# Patient Record
Sex: FEMALE | Race: WHITE
[De-identification: names, ages, dates, MRNs, and addresses within clinical notes are randomized per-mention and may not be internally consistent; named-entity substitution may affect disease eponyms.]

---

## 2018-04-24 ENCOUNTER — HOSPITAL ENCOUNTER (OUTPATIENT)
Dept: HOSPITAL 42 - ED | Age: 33
Setting detail: OBSERVATION
LOS: 1 days | Discharge: HOME | End: 2018-04-25
Attending: INTERNAL MEDICINE | Admitting: INTERNAL MEDICINE
Payer: COMMERCIAL

## 2018-04-24 VITALS — BODY MASS INDEX: 30 KG/M2

## 2018-04-24 VITALS — RESPIRATION RATE: 20 BRPM

## 2018-04-24 DIAGNOSIS — E86.0: ICD-10-CM

## 2018-04-24 DIAGNOSIS — Z98.51: ICD-10-CM

## 2018-04-24 DIAGNOSIS — I95.9: ICD-10-CM

## 2018-04-24 DIAGNOSIS — A05.9: Primary | ICD-10-CM

## 2018-04-24 DIAGNOSIS — K76.0: ICD-10-CM

## 2018-04-24 DIAGNOSIS — G43.909: ICD-10-CM

## 2018-04-24 DIAGNOSIS — D64.9: ICD-10-CM

## 2018-04-24 DIAGNOSIS — E86.1: ICD-10-CM

## 2018-04-24 LAB
ALBUMIN SERPL-MCNC: 4.1 G/DL (ref 3–4.8)
ALBUMIN/GLOB SERPL: 1.5 {RATIO} (ref 1.1–1.8)
ALT SERPL-CCNC: 71 U/L (ref 7–56)
APPEARANCE UR: CLEAR
APTT BLD: 23.6 SECONDS (ref 25.1–36.5)
AST SERPL-CCNC: 38 U/L (ref 14–36)
BILIRUB UR-MCNC: NEGATIVE MG/DL
BUN SERPL-MCNC: 14 MG/DL (ref 7–21)
CALCIUM SERPL-MCNC: 8.7 MG/DL (ref 8.4–10.5)
COLOR UR: YELLOW
ERYTHROCYTE [DISTWIDTH] IN BLOOD BY AUTOMATED COUNT: 12.6 % (ref 11.5–14.5)
GFR NON-AFRICAN AMERICAN: > 60
GLUCOSE UR STRIP-MCNC: NEGATIVE MG/DL
HDLC SERPL-MCNC: 39 MG/DL (ref 29–60)
HEPATITIS A IGM: NEGATIVE
HEPATITIS B CORE AB: NEGATIVE
HEPATITIS C ANTIBODY: NEGATIVE
HGB BLD-MCNC: 12.4 G/DL (ref 12–16)
INR PPP: 0.91 (ref 0.93–1.08)
LDLC SERPL-MCNC: 54 MG/DL (ref 0–129)
LEUKOCYTE ESTERASE UR-ACNC: NEGATIVE LEU/UL
LIPASE SERPL-CCNC: 100 U/L (ref 23–300)
MCH RBC QN AUTO: 28.2 PG (ref 25–35)
MCHC RBC AUTO-ENTMCNC: 33.8 G/DL (ref 31–37)
MCV RBC AUTO: 83.6 FL (ref 80–105)
PH UR STRIP: 6 [PH] (ref 4.7–8)
PLATELET # BLD: 247 10^3/UL (ref 120–450)
PMV BLD AUTO: 9.3 FL (ref 7–11)
PROT UR STRIP-MCNC: NEGATIVE MG/DL
PROTHROMBIN TIME: 10.4 SECONDS (ref 9.4–12.5)
RBC # BLD AUTO: 4.39 10^6/UL (ref 3.5–6.1)
RBC # UR STRIP: NEGATIVE /UL
SP GR UR STRIP: 1.01 (ref 1–1.03)
UROBILINOGEN UR STRIP-ACNC: 0.2 E.U./DL
WBC # BLD AUTO: 14.3 10^3/UL (ref 4.5–11)

## 2018-04-24 PROCEDURE — 36415 COLL VENOUS BLD VENIPUNCTURE: CPT

## 2018-04-24 PROCEDURE — 96375 TX/PRO/DX INJ NEW DRUG ADDON: CPT

## 2018-04-24 PROCEDURE — 96361 HYDRATE IV INFUSION ADD-ON: CPT

## 2018-04-24 PROCEDURE — 80061 LIPID PANEL: CPT

## 2018-04-24 PROCEDURE — 83690 ASSAY OF LIPASE: CPT

## 2018-04-24 PROCEDURE — 87389 HIV-1 AG W/HIV-1&-2 AB AG IA: CPT

## 2018-04-24 PROCEDURE — 96376 TX/PRO/DX INJ SAME DRUG ADON: CPT

## 2018-04-24 PROCEDURE — 96372 THER/PROPH/DIAG INJ SC/IM: CPT

## 2018-04-24 PROCEDURE — 85610 PROTHROMBIN TIME: CPT

## 2018-04-24 PROCEDURE — 96365 THER/PROPH/DIAG IV INF INIT: CPT

## 2018-04-24 PROCEDURE — 87040 BLOOD CULTURE FOR BACTERIA: CPT

## 2018-04-24 PROCEDURE — 85027 COMPLETE CBC AUTOMATED: CPT

## 2018-04-24 PROCEDURE — 80074 ACUTE HEPATITIS PANEL: CPT

## 2018-04-24 PROCEDURE — 83735 ASSAY OF MAGNESIUM: CPT

## 2018-04-24 PROCEDURE — 81003 URINALYSIS AUTO W/O SCOPE: CPT

## 2018-04-24 PROCEDURE — 96367 TX/PROPH/DG ADDL SEQ IV INF: CPT

## 2018-04-24 PROCEDURE — 85730 THROMBOPLASTIN TIME PARTIAL: CPT

## 2018-04-24 PROCEDURE — 99285 EMERGENCY DEPT VISIT HI MDM: CPT

## 2018-04-24 PROCEDURE — 85025 COMPLETE CBC W/AUTO DIFF WBC: CPT

## 2018-04-24 PROCEDURE — 80053 COMPREHEN METABOLIC PANEL: CPT

## 2018-04-24 PROCEDURE — 74177 CT ABD & PELVIS W/CONTRAST: CPT

## 2018-04-24 PROCEDURE — 87209 SMEAR COMPLEX STAIN: CPT

## 2018-04-24 PROCEDURE — 82248 BILIRUBIN DIRECT: CPT

## 2018-04-24 PROCEDURE — 87177 OVA AND PARASITES SMEARS: CPT

## 2018-04-24 PROCEDURE — 87045 FECES CULTURE AEROBIC BACT: CPT

## 2018-04-24 RX ADMIN — METRONIDAZOLE SCH: 500 INJECTION, SOLUTION INTRAVENOUS at 07:58

## 2018-04-24 RX ADMIN — ENOXAPARIN SODIUM SCH MG: 40 INJECTION SUBCUTANEOUS at 13:31

## 2018-04-24 RX ADMIN — METRONIDAZOLE SCH MLS/HR: 500 INJECTION, SOLUTION INTRAVENOUS at 21:57

## 2018-04-24 RX ADMIN — CEFEPIME SCH MLS/HR: 1 INJECTION, SOLUTION INTRAVENOUS at 13:32

## 2018-04-24 RX ADMIN — PANTOPRAZOLE SODIUM SCH MG: 40 TABLET, DELAYED RELEASE ORAL at 21:56

## 2018-04-24 RX ADMIN — CEFEPIME SCH MLS/HR: 1 INJECTION, SOLUTION INTRAVENOUS at 21:56

## 2018-04-24 RX ADMIN — METRONIDAZOLE SCH MLS/HR: 500 INJECTION, SOLUTION INTRAVENOUS at 13:32

## 2018-04-24 NOTE — CT
EXAM:

  CT Abdomen and Pelvis With Intravenous Contrast



CLINICAL HISTORY:

  33 years old, female; Pain; Abdominal pain



TECHNIQUE:

  Axial computed tomography images of the abdomen and pelvis with intravenous 

contrast.  All CT scans at this facility use one or more dose reduction 

techniques, viz.: automated exposure control; ma/kV adjustment per patient size 

(including targeted exams where dose is matched to indication; i.e. head); or 

iterative reconstruction technique.

  Coronal and sagittal reformatted images were created and reviewed.



CONTRAST:

  96 mL of OMNI 350 administered intravenously.



COMPARISON:

  No relevant prior studies available.



FINDINGS:

  Lung bases:  Minimal atelectasis/scarring.



 ABDOMEN:

  Liver:  Probable fatty infiltration.  < 0.5 cm lesion.

  Gallbladder and bile ducts:  No calcified stones.  No ductal dilation.

  Pancreas:  No ductal dilation.  No mass.

  Spleen:  No splenomegaly.

  Adrenals:  No mass.

  Kidneys and ureters:  Too small to characterize lesion within LEFT kidney.  

No hydronephrosis.

  Stomach and bowel:  Segmental areas of apparent mild-to-moderate mural 

thickening of large bowel.  No associated inflammatory stranding.  No 

obstruction.



 PELVIS:

  Appendix:  Normal caliber.  No definite inflammation.

  Bladder:  Unremarkable.

  Reproductive:  Multiple ovarian follicles.



 ABDOMEN and PELVIS:

  Intraperitoneal space:  Trace free fluid within pelvis.  No free air.

  Bones/joints:  No acute fracture.

  Soft tissues:  Unremarkable.

  Vasculature:  Unremarkable.  No aneurysm.

  Lymph nodes:  No pathologically enlarged lymph nodes.



IMPRESSION:     

1.  Probable colitis.  Clinical correlation is needed.

2.  Liver lesion.  For patients with low to average risk of malignancy, no 

further follow-up is necessary.  For patients with high risk of malignancy 

(known malignancy that can metastasize or other risk factors), recommend 

follow-up abdominal CT or MR in 6 months.

3.  Incidental/non-acute findings are described above.

## 2018-04-24 NOTE — CON
DATE:



REQUESTING PHYSICIAN:  Daquan Perez MD



REASON FOR CONSULTATION:  I have been asked to see this 33-year-old female,

previously healthy, who comes to the emergency room after waking up in the

middle of the night with severe mid abdominal pain associated with nausea

and vomiting.  In the emergency room, patient had one episode of diarrhea. 

Patient states that her last meal was approximately 8:00 at night from a

[employer].  Patient has never experienced abdominal pain as what she

experienced prior to coming into the hospital.  She denies any hematemesis,

rectal bleeding, fevers, chills, recent travel or recent use of

antibiotics.  CT scan of the abdomen and pelvis performed in the emergency

room showed some mural thickening of the large intestine, fatty liver and

indeterminate small lesion in the liver measuring less than 0.5 cm and

multiple ovarian follicles with a small amount of trace free fluid in the

pelvis.  She denies any further nausea or vomiting, but still has some mid

abdominal pain.



PAST MEDICAL HISTORY:  Unremarkable.



PAST SURGICAL HISTORY:  Notable for , tubal ligation, reversal of

tubal ligation, abdominoplasty and left lumpectomy.



SOCIAL HISTORY:  She denies cigarette smoking, alcohol use.



FAMILY HISTORY:  Noncontributory.



MEDICATIONS:  None.



REVIEW OF SYSTEMS:  A 14-point review of systems is notable for diffuse mid

abdominal pain, nausea, vomiting and one episode of diarrhea.



PHYSICAL EXAMINATION:

GENERAL:  Well-developed female lying in bed, in no acute distress.

VITAL SIGNS:  Reveal temperature of 98, blood pressure 103/42, heart rate

82.

HEENT:  Reveal sclerae to be white.  Conjunctivae pink.

NECK:  Supple.

CHEST:  Lungs are clear.

HEART:  Reveals regular rate and rhythm.

ABDOMEN:  Soft, mild diffuse mid abdominal tenderness.  No rebound.  No

guarding.  Obese.

EXTREMITIES:  Show no edema.



LABORATORY DATA:  Reveals white blood cell count 14.3, hemoglobin 12.4. 

Chemistries reveal normal electrolytes, blood sugar 139, AST 38, ALT 71. 

Lipase is normal.



IMPRESSION:

1.  Probable acute enterocolitis secondary to food poisoning.

2.  Mildly elevated liver enzymes, most likely secondary to hepatic

steatosis.



RECOMMENDATIONS:

1.  Check stool for culture and sensitivity, and ova and parasites.

2.  Gradual diet advancement as tolerated.  She is currently on clear

liquid diet.





__________________________________________

Marquise Lester MD



DD:  2018 12:36:08

DT:  2018 12:38:53

[facility] # [id number]

## 2018-04-24 NOTE — ED PDOC
Arrival/HPI





- General


Chief Complaint: Abdominal Pain


Time Seen by Provider: 04/24/18 02:27


Historian: Patient





- History of Present Illness


Narrative History of Present Illness (Text): 


04/24/18 03:38


A 33 year old female presents to the emergency department complaining of sudden 

onset abdominal cramp-like discomfort, more on the left side of abdomen. 

Patient reports pain developed after eating out at a restaurant earlier today. 

Patient reports pain associated with nausea and an episode of vomiting. Patient 

denies any fever, back pain, urinary complaints or any other complaints at this 

time.





Symptom Onset: Sudden


Symptom Course: Unchanged


Activities at Onset: Rest


Context: Home





Past Medical History





- Provider Review


Nursing Documentation Reviewed: Yes





- Infectious Disease


Hx of Infectious Diseases: None





- Tetanus Immunization


Tetanus Immunization: Unknown





- Past Medical History


Past Medical History: No Previous





- Cardiac


Hx Cardiac Disorders: No





- Pulmonary


Hx Asthma: Yes





- Neurological


Hx Migraine: Yes





- HEENT


Hx HEENT Disorder: No





- Renal


Hx Renal Disorder: No





- Endocrine/Metabolic


Hx Endocrine Disorders: No





- Hematological/Oncological


Hx Blood Disorders: No





- Integumentary


Hx Dermatological Disorder: No





- Musculoskeletal/Rheumatological


Hx Musculoskeletal Disorders: No





- Gastrointestinal


Hx Gastrointestinal Disorders: No





- Genitourinary/Gynecological


Hx Genitourinary Disorders: No





- Psychiatric


Hx Psychophysiologic Disorder: No


Hx Depression: No


Hx Emotional Abuse: No


Hx Physical Abuse: No


Hx Substance Use: No





- Surgical History


Other/Comment: tubaligation reversal tummy tuck





- Anesthesia


Hx Anesthesia: No





- Suicidal Assessment


Feels Threatened In Home Enviroment: No





Family/Social History





- Physician Review


Nursing Documentation Reviewed: Yes


Family/Social History: No Known Family HX


Smoking Status: Never Smoked


Hx Alcohol Use: No


Hx Substance Use: No


Hx Substance Use Treatment: No





Allergies/Home Meds


Allergies/Adverse Reactions: 


Allergies





morphine Allergy (Intermediate, Verified 04/24/18 03:25)


 RASH








Home Medications: 


 Home Meds











 Medication  Instructions  Recorded  Confirmed


 


No Known Home Med  03/24/16 04/24/18














Review of Systems





- Physician Review


All systems were reviewed & negative as marked: Yes





- Review of Systems


Constitutional: absent: Fevers


Gastrointestinal: Abdominal Pain (left side), Nausea, Vomiting


Genitourinary Female: absent: Dysuria, Frequency, Hematuria, Urine Output 

Changes


Musculoskeletal: absent: Back Pain





Physical Exam


Vital Signs Reviewed: Yes


Vital Signs











  Temp Pulse Resp BP Pulse Ox


 


 04/24/18 05:19   85  18  114/68  98


 


 04/24/18 03:19  98.1 F  80  19  136/61  100











Temperature: Afebrile


Blood Pressure: Normal


Pulse: Regular


Respiratory Rate: Normal


Appearance: Positive for: Well-Appearing, Non-Toxic, Comfortable


Pain Distress: None


Mental Status: Positive for: Alert and Oriented X 3





- Systems Exam


Head: Present: Atraumatic, Normocephalic


Pupils: Present: PERRL


Extroacular Muscles: Present: EOMI


Conjunctiva: Present: Normal


Mouth: Present: Moist Mucous Membranes


Neck: Present: Normal Range of Motion


Respiratory/Chest: Present: Clear to Auscultation, Good Air Exchange.  No: 

Respiratory Distress, Accessory Muscle Use


Cardiovascular: Present: Regular Rate and Rhythm, Normal S1, S2.  No: Murmurs


Abdomen: Present: Tenderness (left lower abd area), Normal Bowel Sounds, 

Guarding (voluntary).  No: Rebound


Back: Present: Normal Inspection


Upper Extremity: Present: Normal Inspection.  No: Cyanosis, Edema


Lower Extremity: Present: Normal Inspection.  No: Edema


Neurological: Present: GCS=15, CN II-XII Intact, Speech Normal


Skin: Present: Warm, Dry, Normal Color.  No: Rashes


Psychiatric: Present: Alert, Oriented x 3, Normal Insight, Normal Concentration





Medical Decision Making


ED Course and Treatment: 


04/24/18 03:34


Impression:


A 33 year old female with abdominal pain, nausea and vomiting. 





Plan:


-- labs


-- Urinalysis


-- Pepcid, Toradol, IV fluids, Zofran


-- Reassess and disposition





Progress Notes:





CT Abdomen and Pelvis With Intravenous Contrast


FINDINGS: Lung bases: Minimal atelectasis/scarring.


ABDOMEN: Liver: Probable fatty infiltration. < 0.5 cm lesion.


Gallbladder and bile ducts: No calcified stones. No ductal dilation.


Pancreas: No ductal dilation. No mass.


Spleen: No splenomegaly.


Adrenals: No mass.


Kidneys and ureters: Too small to characterize lesion within LEFT kidney. No 

hydronephrosis.


Stomach and bowel: Segmental areas of apparent mild-to-moderate mural 

thickening of large bowel.


No associated inflammatory stranding. No obstruction.


PELVIS: Appendix: Normal caliber. No definite inflammation.


Bladder: Unremarkable.


Reproductive: Multiple ovarian follicles.


ABDOMEN and PELVIS: Intraperitoneal space: Trace free fluid within pelvis. No 

free air.


Bones/joints: No acute fracture.


Soft tissues: Unremarkable.


Vasculature: Unremarkable. No aneurysm.


Lymph nodes: No pathologically enlarged lymph nodes.


IMPRESSION:


1. Probable colitis. Clinical correlation is needed.


2. Liver lesion. For patients with low to average risk of malignancy, no 

further follow-up is necessary.


For patients with high risk of malignancy (known malignancy that can 

metastasize or other risk


factors), recommend follow-up abdominal CT or MR in 6 months.


3. Incidental/non-acute findings are described above.


Dictated and Authenticated by: Catracho Jean MD


04/24/2018 6:04 AM Eastern Time ([place] & [place])











04/24/18 06:46


Case was d/w  who accepts to his service.Request  on 

consult.Medical resident notified.





- Lab Interpretations


Lab Results: 








 04/24/18 03:55 





 04/24/18 03:55 





 Lab Results





04/24/18 03:55: WBC 14.3 H D, RBC 4.39, Hgb 12.4, Hct 36.7, MCV 83.6, MCH 28.2, 

MCHC 33.8, RDW 12.6, Plt Count 247, MPV 9.3


04/24/18 03:55: Sodium 142, Potassium 3.8, Chloride 106, Carbon Dioxide 24, 

Anion Gap 17, BUN 14, Creatinine 1.0, Est GFR (African Amer) > 60, Est GFR (Non-

Af Amer) > 60, Random Glucose 139 H, Calcium 8.7, Total Bilirubin 0.2, AST 38 H

, ALT 71 H, Alkaline Phosphatase 52, Total Protein 6.8, Albumin 4.1, Globulin 

2.7, Albumin/Globulin Ratio 1.5, Lipase 100








I have reviewed the lab results: Yes





- RAD Interpretation


Radiology Orders: 








04/24/18 04:47


ABD & PELVIS IV CONTRAST ONLY [CT] Stat 














- Medication Orders


Current Medication Orders: 








Ceftriaxone Sodium (Rocephin 1 Gram Ivpb)  1 gm in 100 mls @ 200 mls/hr IV ONCE 

STA


   PRN Reason: Protocol


   Stop: 04/24/18 07:10


Metronidazole (Flagyl)  500 mg in 100 mls @ 100 mls/hr IV STAT STA


   PRN Reason: Protocol


   Stop: 04/24/18 07:40





Discontinued Medications





Famotidine (Pepcid)  20 mg IVP STAT STA


   Stop: 04/24/18 03:27


   Last Admin: 04/24/18 03:53  Dose: 20 mg





IVP Administration


 Document     04/24/18 03:53  RG  (Rec: 04/24/18 03:59  RG  [id number])


     Charges for Administration


      # of IVP Administrations                   1





Sodium Chloride (Sodium Chloride 0.9%)  1,000 mls @ 999 mls/hr IV .Q1H1M STA


   Stop: 04/24/18 04:26


   Last Admin: 04/24/18 03:57  Dose: 999 mls/hr





eMAR Start Stop


 Document     04/24/18 03:57  RG  (Rec: 04/24/18 03:59  RG  [id number])


     Intravenous Solution


      Start Date                                 04/24/18


      Start Time                                 03:57





Ketorolac Tromethamine (Toradol)  30 mg IVP ONCE ONE


   Stop: 04/24/18 03:27


   Last Admin: 04/24/18 03:54  Dose: 30 mg





MAR Pain Assessment


 Document     04/24/18 03:54  RG  (Rec: 04/24/18 04:00  RG  [id number])


     Pain Reassessment


      Is this a pain reassessment?               Yes


     Sleep


      Is patient sleeping during reassessment?   No


     Presence of Pain


      Presence of Pain                           Yes


     Pain Scale Used


      Pain Scale Used                            Numeric


     Location


      Upper or Lower                             Lower


      Pain Location Body Site                    Abdomen


     Description


      Description                                Constant


      Pain Behavior                              Moaning


                                                 Guarding


                                                 Irritability


                                                 Rubbing Site


IVP Administration


 Document     04/24/18 03:54  RG  (Rec: 04/24/18 04:00  RG  [id number])


     Charges for Administration


      # of IVP Administrations                   1


Re-Assess: MAR Pain Assessment


 Document     04/24/18 04:54  RG  (Rec: 04/24/18 05:27  RG  [id number])


     Pain Reassessment


      Is this a pain reassessment?               Yes


     Sleep


      Is patient sleeping during reassessment?   No


     Presence of Pain


      Presence of Pain                           No





Ondansetron HCl (Zofran Inj)  4 mg IVP ONCE ONE


   Stop: 04/24/18 03:27


   Last Admin: 04/24/18 03:34  Dose: 4 mg





IVP Administration


 Document     04/24/18 03:34  RG  (Rec: 04/24/18 04:00  RG  [id number])


     Charges for Administration


      # of IVP Administrations                   1














- Scribe Statement


The provider has reviewed the documentation as recorded by the Scribe


Belqes Ralph





Provider Scribe Attestation:


All medical record entries made by the Scribe were at my direction and 

personally dictated by me. I have reviewed the chart and agree that the record 

accurately reflects my personal performance of the history, physical exam, 

medical decision making, and the department course for this patient. I have 

also personally directed, reviewed, and agree with the discharge instructions 

and disposition.











Disposition/Present on Arrival





- Present on Arrival


Any Indicators Present on Arrival: No


History of DVT/PE: No


History of Uncontrolled Diabetes: No


Urinary Catheter: No


History of Decub. Ulcer: No


History Surgical Site Infection Following: None





- Disposition


Have Diagnosis and Disposition been Completed?: Yes


Diagnosis: 


 Abdominal pain, Colitis





Disposition: HOSPITALIZED


Disposition Time: 06:46


Patient Plan: Observation


Condition: STABLE


Forms:  [facility] (English)

## 2018-04-24 NOTE — CP.PCM.HP
History of Present Illness





- History of Present Illness


History of Present Illness: 


H&P for Dr. Perez's service - Jasmin PGY2





cc: abdominal pain


HPI: Patient is a 34yo female with no significant past medical history that 

presented c/o abdominal pain. She reported that the pain localized to the left 

upper quadrant and was non-radiating and dull in sensation. The pain started at 

approximately 2am and woke her from her sleep. She admitted to eating some 

questionable food during lunch. Her abdominal pain was associated with nausea, 

nonbilious nonbloody vomiting and chills. Prior to this episode she was in her 

usual state of health and had never experienced anything like this in the past. 

No apparent alleviating/exacerbating factors. Denies chest pain, palpitations, 

SOB, fevers, cough, sick contacts, focal weakness, numbness, tingling.





12point ROS as per HPI above otherwise negative


PMHx: as stated above


PSHx: , tubal ligation, reversal of tubal ligation, left lumpectomy, 

abdominoplasty


Allergies: morphine


Medications: denies


Social Hx: denies tobacco use, social alcohol use; denies illicit drug use


Family Hx: reviewed, non-contributory








Present on Admission





- Present on Admission


Any Indicators Present on Admission: No





Past Patient History





- Infectious Disease


Hx of Infectious Diseases: None





- Tetanus Immunizations


Tetanus Immunization: Unknown





- Past Social History


Smoking Status: Never Smoked





- CARDIAC


Hx Cardiac Disorders: No





- PULMONARY


Hx Asthma: Yes





- NEUROLOGICAL


Hx Migraine: Yes





- HEENT


Hx HEENT Problems: No





- RENAL


Hx Chronic Kidney Disease: No





- ENDOCRINE/METABOLIC


Hx Endocrine Disorders: No





- HEMATOLOGICAL/ONCOLOGICAL


Hx Blood Disorders: No





- INTEGUMENTARY


Hx Dermatological Problems: No





- MUSCULOSKELETAL/RHEUMATOLOGICAL


Hx Musculoskeletal Disorders: No





- GASTROINTESTINAL


Hx Gastrointestinal Disorders: No





- GENITOURINARY/GYNECOLOGICAL


Hx Genitourinary Disorders: No





- PSYCHIATRIC


Hx Psychophysiologic Disorder: No


Hx Depression: No


Hx Emotional Abuse: No


Hx Physical Abuse: No


Hx Substance Use: No





- SURGICAL HISTORY


Other/Comment: tubaligation reversal tummy tuck





- ANESTHESIA


Hx Anesthesia: No





Meds


Allergies/Adverse Reactions: 


 Allergies











Allergy/AdvReac Type Severity Reaction Status Date / Time


 


morphine Allergy Intermediate RASH Verified 18 03:25














Physical Exam





- Constitutional


Appears: No Acute Distress





- Head Exam


Head Exam: ATRAUMATIC, NORMAL INSPECTION, NORMOCEPHALIC





- Eye Exam


Eye Exam: EOMI


Pupil Exam: PERRL





- ENT Exam


ENT Exam: Mucous Membranes Moist





- Respiratory Exam


Respiratory Exam: Clear to Auscultation Bilateral.  absent: Rales, Rhonchi, 

Wheezes





- Cardiovascular Exam


Cardiovascular Exam: RRR, +S1, +S2.  absent: Gallop, Rubs





- GI/Abdominal Exam


GI & Abdominal Exam: Soft, Tenderness (left-sided tenderness to palpation).  

absent: Distended, Firm, Guarding, Rebound





- Extremities Exam


Extremities exam: Positive for: normal inspection.  Negative for: pedal edema





- Neurological Exam


Neurological exam: Alert, CN II-XII Intact, Oriented x3





- Psychiatric Exam


Psychiatric exam: Normal Affect, Normal Mood





- Skin


Skin Exam: Dry, Intact, Normal Color, Warm





Results





- Vital Signs


Recent Vital Signs: 





 Last Vital Signs











Temp  98 F   18 07:27


 


Pulse  82   18 07:27


 


Resp  16   18 07:27


 


BP  103/42 L  18 07:27


 


Pulse Ox  97   18 07:27














- Labs


Result Diagrams: 


 18 03:55





 18 03:55


Labs: 





 Laboratory Results - last 24 hr











  18





  06:50 07:15


 


PT  10.4 


 


INR  0.91 L 


 


APTT  23.6 L 


 


Urine Color   Yellow


 


Urine Appearance   Clear


 


Urine pH   6.0


 


Ur Specific Gravity   1.010


 


Urine Protein   Negative


 


Urine Glucose (UA)   Negative


 


Urine Ketones   Negative


 


Urine Blood   Negative


 


Urine Nitrate   Negative


 


Urine Bilirubin   Negative


 


Urine Urobilinogen   0.2


 


Ur Leukocyte Esterase   Negative














Assessment & Plan





- Assessment and Plan (Free Text)


Plan: 


34yo female with no significant history presents c/o left-sided abdominal pain 

associated with nausea/vomiting 





1. Abdominal pain


-afebrile with leukocytosis of 14.3


-UA negative


-mild transaminitis


-CT abd/pelvis revealed probable colitis, liver lesion < 0.5cm, probable fatty 

infiltration of the liver


-IVF hydration


-Zofran for nausea/vomiting


-pain control


-Continue protonix


-liquid diet advanced as tolerated


-Received rocephin/flagyl in the ED, continue with cefepime/flagyl


-hepatitis panel pending


-GI consulted - Dr. Lester





2. GI/DVT prophylaxis


-Protonix/SCD's





Patient seen and case discussed with attending, Dr. Perez





- Date & Time


Date: 18


Time: 08:13

## 2018-04-25 VITALS
HEART RATE: 74 BPM | TEMPERATURE: 98.1 F | SYSTOLIC BLOOD PRESSURE: 96 MMHG | DIASTOLIC BLOOD PRESSURE: 49 MMHG | OXYGEN SATURATION: 98 %

## 2018-04-25 LAB
ALBUMIN SERPL-MCNC: 2.8 G/DL (ref 3–4.8)
ALBUMIN/GLOB SERPL: 1.2 {RATIO} (ref 1.1–1.8)
ALT SERPL-CCNC: 55 U/L (ref 7–56)
AST SERPL-CCNC: 27 U/L (ref 14–36)
BASOPHILS # BLD AUTO: 0.03 K/MM3 (ref 0–2)
BASOPHILS NFR BLD: 0.5 % (ref 0–3)
BILIRUB DIRECT SERPL-MCNC: 0.1 MG/DL (ref 0–0.4)
BUN SERPL-MCNC: 5 MG/DL (ref 7–21)
CALCIUM SERPL-MCNC: 7.9 MG/DL (ref 8.4–10.5)
EOSINOPHIL # BLD: 0.2 10*3/UL (ref 0–0.7)
EOSINOPHIL NFR BLD: 3.2 % (ref 1.5–5)
ERYTHROCYTE [DISTWIDTH] IN BLOOD BY AUTOMATED COUNT: 13.1 % (ref 11.5–14.5)
GFR NON-AFRICAN AMERICAN: > 60
GRANULOCYTES # BLD: 2.96 10*3/UL (ref 1.4–6.5)
GRANULOCYTES NFR BLD: 49.8 % (ref 50–68)
HGB BLD-MCNC: 10.6 G/DL (ref 12–16)
LYMPHOCYTES # BLD: 2.4 10*3/UL (ref 1.2–3.4)
LYMPHOCYTES NFR BLD AUTO: 41.1 % (ref 22–35)
MCH RBC QN AUTO: 27.5 PG (ref 25–35)
MCHC RBC AUTO-ENTMCNC: 32.7 G/DL (ref 31–37)
MCV RBC AUTO: 84.2 FL (ref 80–105)
MONOCYTES # BLD AUTO: 0.3 10*3/UL (ref 0.1–0.6)
MONOCYTES NFR BLD: 5.4 % (ref 1–6)
PLATELET # BLD: 215 10^3/UL (ref 120–450)
PMV BLD AUTO: 9.4 FL (ref 7–11)
RBC # BLD AUTO: 3.85 10^6/UL (ref 3.5–6.1)
WBC # BLD AUTO: 5.9 10^3/UL (ref 4.5–11)

## 2018-04-25 RX ADMIN — METRONIDAZOLE SCH MLS/HR: 500 INJECTION, SOLUTION INTRAVENOUS at 06:28

## 2018-04-25 RX ADMIN — CEFEPIME SCH MLS/HR: 1 INJECTION, SOLUTION INTRAVENOUS at 06:28

## 2018-04-25 RX ADMIN — ENOXAPARIN SODIUM SCH: 40 INJECTION SUBCUTANEOUS at 09:00

## 2018-04-25 RX ADMIN — PANTOPRAZOLE SODIUM SCH MG: 40 TABLET, DELAYED RELEASE ORAL at 06:29

## 2018-04-25 NOTE — HP
DATE OF EXAM:  2018



The patient is seen, examined in room 566, bed 1.  The patient's vital

signs, diagnostic data reviewed.



Please refer to the detailed history and physical examination by the

medical resident for complete and further details.



IMPRESSION AND PLAN:

1.  Abdominal pain and cramping status post after eating outside food.

2.  Abdominal pain.

3.  Leukocytosis.

4.  Transaminitis.

5.  Hyperglycemia.

6.  Probable hepatic steatosis and fatty liver.

7.  Minimal atelectasis and scarring.

8.  Questionable and probable colitis with segmental areas of

mild-to-moderate mural thickening of the large bowel.

9.  Less than 0.5-cm hepatic lesion, etiology undetermined.

10.  History of migraine, history of tubal ligation, history of 

section, history of abdominoplasty, history of left breast cyst surgery,

history of tummy tuck and abdominoplasty.

11.  Questionable gastroenteritis versus colitis with abdominal pain and

cramping.

12.  History of tubal ligation and reversal, history of tummy tuck and

abdominoplasty.



PLAN AT THIS TIME:  The patient has been admitted through the emergency

room.  The patient has been ordered hepatitis panel, CMP, LFT, magnesium,

phosphorus.  Blood cultures are ordered.



CURRENT CONSULTATION:  Gastroenterology.



CURRENT MEDICATIONS:  Flagyl 500 mg every 8; cefepime 1 g IV every 8;

Protonix 40 every 12; IV fluid 0.9 normal saline at 100 mL an hour; Zofran

4 mg IV every 4; liquid diet ordered; out of bed ordered; SCDs, MALGORZATA

stockings ordered.  The patient at present is to be continued on GI, DVT

prophylaxis.  The patient has been advised out of bed to chair.



At present, the patient's further management will be dependent upon the

patient's clinical condition, hemodynamic status and as per the patient

response to therapeutic intervention, as per the patient's diagnostic test

results and as per recommendation by Gastroenterology.  All of the above

discussed and explained to the patient at length and all questions

concerned answered, which she acknowledged and understand.



Please refer to the detailed history and physical examination by the

medical resident for all further details.



Dictated and electronically signed, not read.









__________________________________________

Daquan Perez MD



DD:  2018 10:36:42

DT:  2018 10:43:07

Job # [id number]

## 2018-04-25 NOTE — CP.PCM.DIS
Provider





- Provider


Date of Admission: 


04/24/18 06:47





Attending physician: 


Daquan Perez MD





Primary care physician: 


NO PRIMARY CARE PROVIDER





Consults: 





GI - Dr. Lester


Time Spent in preparation of Discharge (in minutes): 30





Hospital Course





- Lab Results


Lab Results: 


 Micro Results





04/24/18 07:30   Blood   Blood Culture - Preliminary


                            NO GROWTH AFTER 24 HOURS


04/24/18 06:50   Blood   Blood Culture - Preliminary


                            NO GROWTH AFTER 24 HOURS





 Most Recent Lab Values











WBC  5.9 10^3/ul (4.5-11.0)  D 04/25/18  07:10    


 


RBC  3.85 10^6/uL (3.5-6.1)   04/25/18  07:10    


 


Hgb  10.6 g/dL (12.0-16.0)  L  04/25/18  07:10    


 


Hct  32.4 % (36.0-48.0)  L  04/25/18  07:10    


 


MCV  84.2 fl (80.0-105.0)   04/25/18  07:10    


 


MCH  27.5 pg (25.0-35.0)   04/25/18  07:10    


 


MCHC  32.7 g/dl (31.0-37.0)   04/25/18  07:10    


 


RDW  13.1 % (11.5-14.5)   04/25/18  07:10    


 


Plt Count  215 10^3/uL (120.0-450.0)   04/25/18  07:10    


 


MPV  9.4 fl (7.0-11.0)   04/25/18  07:10    


 


Gran %  49.8 % (50.0-68.0)  L  04/25/18  07:10    


 


Lymph % (Auto)  41.1 % (22.0-35.0)  H  04/25/18  07:10    


 


Mono % (Auto)  5.4 % (1.0-6.0)   04/25/18  07:10    


 


Eos % (Auto)  3.2 % (1.5-5.0)   04/25/18  07:10    


 


Baso % (Auto)  0.5 % (0.0-3.0)   04/25/18  07:10    


 


Gran #  2.96  (1.4-6.5)   04/25/18  07:10    


 


Lymph # (Auto)  2.4  (1.2-3.4)   04/25/18  07:10    


 


Mono # (Auto)  0.3  (0.1-0.6)   04/25/18  07:10    


 


Eos # (Auto)  0.2  (0.0-0.7)   04/25/18  07:10    


 


Baso # (Auto)  0.03 K/mm3 (0.0-2.0)   04/25/18  07:10    


 


PT  10.4 SECONDS (9.4-12.5)   04/24/18  06:50    


 


INR  0.91  (0.93-1.08)  L  04/24/18  06:50    


 


APTT  23.6 Seconds (25.1-36.5)  L  04/24/18  06:50    


 


Sodium  144 mmol/L (132-148)   04/25/18  07:10    


 


Potassium  3.6 mmol/L (3.6-5.0)   04/25/18  07:10    


 


Chloride  115 mmol/L ()  H  04/25/18  07:10    


 


Carbon Dioxide  21 mmol/L (21-33)   04/25/18  07:10    


 


Anion Gap  11  (10-20)   04/25/18  07:10    


 


BUN  5 mg/dL (7-21)  L  04/25/18  07:10    


 


Creatinine  0.7 mg/dl (0.7-1.2)   04/25/18  07:10    


 


Est GFR ( Amer)  > 60   04/25/18  07:10    


 


Est GFR (Non-Af Amer)  > 60   04/25/18  07:10    


 


Random Glucose  86 mg/dL ()   04/25/18  07:10    


 


Calcium  7.9 mg/dL (8.4-10.5)  L  04/25/18  07:10    


 


Magnesium  1.8 mg/dL (1.7-2.2)   04/25/18  07:10    


 


Total Bilirubin  0.2 mg/dL (0.2-1.3)   04/25/18  07:10    


 


Direct Bilirubin  0.1 mg/dL (0.0-0.4)   04/25/18  07:10    


 


AST  27 U/L (14-36)   04/25/18  07:10    


 


ALT  55 U/L (7-56)   04/25/18  07:10    


 


Alkaline Phosphatase  39 U/L ()   04/25/18  07:10    


 


Total Protein  5.3 g/dL (5.8-8.3)  L  04/25/18  07:10    


 


Albumin  2.8 g/dL (3.0-4.8)  L  04/25/18  07:10    


 


Globulin  2.4 gm/dL  04/25/18  07:10    


 


Albumin/Globulin Ratio  1.2  (1.1-1.8)   04/25/18  07:10    


 


Triglycerides  53 mg/dL ()   04/24/18  10:50    


 


Cholesterol  108 mg/dL (130-200)  L  04/24/18  10:50    


 


LDL Cholesterol Direct  54 mg/dL (0-129)   04/24/18  10:50    


 


HDL Cholesterol  39 mg/dL (29-60)   04/24/18  10:50    


 


Lipase  100 U/L ()   04/24/18  03:55    


 


Urine Color  Yellow  (YELLOW)   04/24/18  07:15    


 


Urine Appearance  Clear  (CLEAR)   04/24/18  07:15    


 


Urine pH  6.0  (4.7-8.0)   04/24/18  07:15    


 


Ur Specific Gravity  1.010  (1.005-1.035)   04/24/18  07:15    


 


Urine Protein  Negative mg/dL (<30 mg/dL)   04/24/18  07:15    


 


Urine Glucose (UA)  Negative mg/dL (NEGATIVE)   04/24/18  07:15    


 


Urine Ketones  Negative mg/dL (NEGATIVE)   04/24/18  07:15    


 


Urine Blood  Negative  (NEGATIVE)   04/24/18  07:15    


 


Urine Nitrate  Negative  (NEGATIVE)   04/24/18  07:15    


 


Urine Bilirubin  Negative  (NEGATIVE)   04/24/18  07:15    


 


Urine Urobilinogen  0.2 E.U./dL (<1 E.U./dL)   04/24/18  07:15    


 


Ur Leukocyte Esterase  Negative Da/uL (NEGATIVE)   04/24/18  07:15    


 


Hepatitis A IgM Ab  Negative  (NEGATIVE)   04/24/18  03:50    


 


Hep Bs Antigen  Negative  (NEGATIVE)   04/24/18  03:50    


 


Hep B Core IgM Ab  Negative  (NEGATIVE)   04/24/18  03:50    


 


Hepatitis C Antibody  Negative  (NEGATIVE)   04/24/18  03:50    


 


HIV 1&2 Ag/Ab, 4th Gen  Nonreactive  (Nonreactive)   04/24/18  03:50    














- Hospital Course


Hospital Course: 


32yo female with no significant past medical history presented c/o abdominal 

pain associated with nausea and vomiting that started in the middle of the 

night and woke her from her sleep. She reported that the pain localized to the 

left upper quadrant and was non-radiating and dull in sensation. She admitted 

to eating some questionable food during the day specifically from a halal food 

truck. A CT abd/pelvis revealed probable colitis and GI was consulted for 

evaluation. She was treated symptomatically and started on IV antibiotics. Her 

symptoms improved and her diet was slowly advanced which she tolerated without 

issue. She reported improvement of her symptoms and was cleared by GI for 

discharge. She was agreeable to follow up with a PMD within 1 week of discharge 

and return to the emergency room should she have any worsening of her symptoms. 





Discharge Exam





- Head Exam


Head Exam: ATRAUMATIC, NORMAL INSPECTION, NORMOCEPHALIC





- Eye Exam


Eye Exam: EOMI, PERRL





- ENT Exam


ENT Exam: Mucous Membranes Moist





- Respiratory Exam


Respiratory Exam: Clear to PA & Lateral.  absent: Rales, Rhonchi, Wheezes





- Cardiovascular Exam


Cardiovascular Exam: RRR, +S1, +S2.  absent: Clicks, Gallop, Rubs





- GI/Abdominal Exam


GI & Abdominal Exam: Soft.  absent: Distended, Firm, Guarding, Rebound, 

Tenderness





- Extremities Exam


Extremities exam: normal inspection





- Back Exam


Back exam: NORMAL INSPECTION





- Neurological Exam


Neurological exam: Alert, CN II-XII Intact, Normal Gait, Oriented x3





- Psychiatric Exam


Psychiatric exam: Normal Affect, Normal Mood





- Skin


Skin Exam: Dry, Intact, Normal Color, Warm





Discharge Plan





- Discharge Medications


Prescriptions: 


Lactobacillus Acidophilus [Bacid Acidophilus] 1 cap PO BID PRN #15 cap


 PRN Reason: Diarrhea





- Follow Up Plan


Condition: STABLE


Disposition: HOME/ ROUTINE


Additional Instructions: 


1. Follow up with Dr. Perez within 1 week of discharge.


2. Follow low-fat diet upon discharge and gradually advance as tolerated


3. Return to the emergency room should your symptoms worsen 


Referrals: 


Daquan Perez MD [Staff Provider] -

## 2018-04-25 NOTE — PN
DATE:  04/25/2018



SUBJECTIVE:  The patient feels better.  Abdominal cramping is less.  She

has not had any bowel movements or diarrhea.  She denies any further

nausea, vomiting.  She is tolerating clear liquids.



OBJECTIVE:

VITAL SIGNS:  Reveal temperature of 97.9, blood pressure 105/61, heart rate

of 77.

HEENT:  Reveal sclerae to be white.  Conjunctivae pink.

NECK:  Supple.

CHEST:  Lungs are clear.

HEART:  Reveals a regular rate and rhythm.

ABDOMEN:  Soft; mild diffuse tenderness, less than previously.  No palpable

mass.

EXTREMITIES:  Show no edema.



LABORATORY DATA:  Reveal hemoglobin 10.6, white blood cell count 5.9,

platelet count 215,000, BUN 5, creatinine 0.7.



IMPRESSION:  Acute gastroenteritis secondary to food poisoning.



RECOMMENDATIONS:

1.  Advance to a low-fat, low-residue diet.

2.  The patient is stable from GI standpoint for discharge with outpatient

followup.







__________________________________________

Marquise Lester MD



DD:  04/25/2018 8:53:16

DT:  04/25/2018 8:55:03

Job # [id number]

## 2018-04-26 NOTE — DS
FINAL PROGRESS NOTE AND DISCHARGE SUMMARY



LOCATION:  The patient was seen in room 566, bed 1.



HISTORY:  Patient is seen lying in the bed.  Patient states that her

abdominal pain symptoms have significantly improved.  Patient reports some

loose bowel movement.  Patient states this is the first time she is having

bowel movement after being admitted.  Patient tolerated the diet, which was

ordered in the morning.



PHYSICAL EXAMINATION:

VITAL SIGNS:  T-max 98.1; heart rate 77, 74, 82; blood pressure 114/68,

103/42, 105/61, 96/49; respiration 16 to18 and O2 sat 98% to100%.

HEENT:  Head examination, normocephalic and atraumatic.  HEENT examination

shows pink conjunctivae.  Anicteric sclerae.  No oropharyngeal lesion.

NECK:  No neck rigidity.

CHEST:  Kyphosis.

LUNGS:  Show no rales, crackles or wheezing.

CARDIOVASCULAR:  S1 and S2, regular rhythm.

ABDOMEN:  Soft.  Positive bowel sound.  Very mild deep left lower quadrant

tenderness.  No rebound tenderness.  No guarding.  No rigidity.  No

costovertebral angle tenderness.

GENITALIA:  Female.

RECTAL EXAMINATION:  Deferred.

EXTREMITIES:  Show no pitting edema, no calf tenderness.  No Homans sign.

MUSCULOSKELETAL:  Shows a body mass index of 30.

NEUROLOGIC:  Patient is alert, awake, oriented x3.  Cranial nerves II

through XII intact.  Gait examination not tested.

VASCULAR:  Palpable pulses.



DIAGNOSTICS:  HIV-1 and 2 is nonreactive.  Hepatitis A, B, C serologies

negative.  Sodium 144, potassium 3.6, chloride 115, CO2 of 21, anion gap

11, BUN 5, creatinine 0.7, GFR greater than 60, glucose 86, calcium 7.9,

magnesium 1.8.  LFTs are normal.  Total protein 5.2, albumin 2.8,

cholesterol 108, LDL 54.  WBC is down to 5.8, 5.9; hemoglobin and

hematocrit 10.6 and 32.4, platelet 215.  Blood cultures, no growth.



FINAL IMPRESSION, PLAN AND DISCHARGE DIAGNOSES:

1.  Most probable acute enterocolitis secondary to food poisoning.

2.  Transient transaminitis.

3.  Asymptomatic hypotension.

4.  Hypovolemia.

5.  Dehydration.

6.  Leukocytosis.

7.  Normocytic anemia.

8.  Acute gastroenteritis secondary to food poisoning.

9.  Minimal atelectasis and scarring.

10.  Hepatic steatosis.

11.  Less than 5 cm hepatic lesion.

12.  Large bowel segmental areas of mild-to-moderate mural thickening.

13.  History of , tubal ligation, reversal of tubal ligation,

history of left lumpectomy, history of migraine _____.

14.  History of abdominoplasty and liposuction.



Plan at this time, patient is to be discharged home on Bacid one capsule

twice a day p.r.n.



Patient has been cleared by Gastroenterology for discharge.  Patient will

be discharged home with followup with Dr. Perez within 1 week.  Patient has

been advised low-fat diet upon discharge to gradually advance.  During this

hospitalization, patient was extensively explained about the details of her

medical condition, diagnoses, test results, all details were explained to

the patient at length.  All questions concerned answered.



Time spent in the entire discharge process more than 45 minutes.



Dictated and electronically signed, not read.







__________________________________________

Daquan Perez MD



DD:  2018 20:47:32

DT:  2018 20:52:08

Job # [id number]